# Patient Record
Sex: FEMALE | Race: WHITE | NOT HISPANIC OR LATINO | Employment: UNEMPLOYED | URBAN - METROPOLITAN AREA
[De-identification: names, ages, dates, MRNs, and addresses within clinical notes are randomized per-mention and may not be internally consistent; named-entity substitution may affect disease eponyms.]

---

## 2018-05-08 ENCOUNTER — HOSPITAL ENCOUNTER (EMERGENCY)
Facility: HOSPITAL | Age: 58
Discharge: HOME | End: 2018-05-08
Payer: MEDICAID

## 2018-05-08 VITALS
HEART RATE: 99 BPM | TEMPERATURE: 99 F | WEIGHT: 130 LBS | OXYGEN SATURATION: 97 % | RESPIRATION RATE: 18 BRPM | SYSTOLIC BLOOD PRESSURE: 137 MMHG | HEIGHT: 60 IN | DIASTOLIC BLOOD PRESSURE: 71 MMHG | BODY MASS INDEX: 25.52 KG/M2

## 2018-05-08 DIAGNOSIS — L29.9 ITCHING: ICD-10-CM

## 2018-05-08 DIAGNOSIS — L23.9 ALLERGIC CONTACT DERMATITIS, UNSPECIFIED TRIGGER: Primary | ICD-10-CM

## 2018-05-08 DIAGNOSIS — L92.3 FOREIGN BODY REACTION TO TATTOO DYES: ICD-10-CM

## 2018-05-08 PROCEDURE — 99283 EMERGENCY DEPT VISIT LOW MDM: CPT | Mod: 25

## 2018-05-08 PROCEDURE — 25000003 PHARM REV CODE 250: Performed by: REGISTERED NURSE

## 2018-05-08 PROCEDURE — 96372 THER/PROPH/DIAG INJ SC/IM: CPT

## 2018-05-08 PROCEDURE — 63600175 PHARM REV CODE 636 W HCPCS: Performed by: REGISTERED NURSE

## 2018-05-08 RX ORDER — HYDROXYZINE HYDROCHLORIDE 25 MG/1
25 TABLET, FILM COATED ORAL EVERY 6 HOURS
Qty: 12 TABLET | Refills: 0 | Status: SHIPPED | OUTPATIENT
Start: 2018-05-08

## 2018-05-08 RX ORDER — TRIAMCINOLONE ACETONIDE 1 MG/G
OINTMENT TOPICAL 2 TIMES DAILY
Qty: 80 G | Refills: 0 | Status: SHIPPED | OUTPATIENT
Start: 2018-05-08 | End: 2018-05-18

## 2018-05-08 RX ORDER — MUPIROCIN 20 MG/G
OINTMENT TOPICAL 3 TIMES DAILY
Qty: 30 G | Refills: 0 | Status: SHIPPED | OUTPATIENT
Start: 2018-05-08

## 2018-05-08 RX ORDER — PREDNISONE 20 MG/1
20 TABLET ORAL 2 TIMES DAILY
Qty: 10 TABLET | Refills: 0 | Status: SHIPPED | OUTPATIENT
Start: 2018-05-08 | End: 2018-05-13

## 2018-05-08 RX ORDER — METHYLPREDNISOLONE SOD SUCC 125 MG
125 VIAL (EA) INJECTION
Status: COMPLETED | OUTPATIENT
Start: 2018-05-08 | End: 2018-05-08

## 2018-05-08 RX ORDER — DIPHENHYDRAMINE HYDROCHLORIDE 50 MG/ML
25 INJECTION INTRAMUSCULAR; INTRAVENOUS
Status: COMPLETED | OUTPATIENT
Start: 2018-05-08 | End: 2018-05-08

## 2018-05-08 RX ORDER — FAMOTIDINE 20 MG/1
20 TABLET, FILM COATED ORAL
Status: COMPLETED | OUTPATIENT
Start: 2018-05-08 | End: 2018-05-08

## 2018-05-08 RX ADMIN — FAMOTIDINE 20 MG: 20 TABLET ORAL at 05:05

## 2018-05-08 RX ADMIN — METHYLPREDNISOLONE SODIUM SUCCINATE 125 MG: 125 INJECTION, POWDER, FOR SOLUTION INTRAMUSCULAR; INTRAVENOUS at 05:05

## 2018-05-08 RX ADMIN — DIPHENHYDRAMINE HYDROCHLORIDE 25 MG: 50 INJECTION, SOLUTION INTRAMUSCULAR; INTRAVENOUS at 05:05

## 2018-05-08 NOTE — ED PROVIDER NOTES
SCRIBE #1 NOTE: I, Marvel Starr, am scribing for, and in the presence of, Chase Sellers NP. I have scribed the entire note.      History      Chief Complaint   Patient presents with    Rash     diffuse rash with itching all over arms, legs, and back x 2 weeks; pt reports taking steroids for the past 3 days with no improvement        Review of patient's allergies indicates:   No Known Allergies     HPI   HPI    5/8/2018, 5:20 PM   History obtained from the patient      History of Present Illness: Karen Smalls is a 57 y.o. female patient who presents to the Emergency Department for a generalized, pruritic rash which onset gradually 2 weeks ago. Symptoms are constant and moderate in severity.  No mitigating or exacerbating factors reported. No other associated sxs reported. Pt reports that she was previously evaluated for her sxs at [facility] and Rx prednisone 20 mg BID. Pt finished the steriods 1 day ago w/ no relief of sxs reported. Pt is unclear on the cause of her sxs. Pt reports that she took one benadryl 3-4 hours ago. Patient denies any fever, chills, n/v/d, wheezing, sore throat, dysphagia, and all other sxs at this time. No further complaints or concerns at this time.     Arrival mode: Personal vehicle    PCP: Provider [facility]       Past Medical History:  History reviewed. No pertinent past medical history.    Past Surgical History:  Past Surgical History:   Procedure Laterality Date    HYSTERECTOMY           Family History:  History reviewed. No pertinent family history.    Social History:  Social History     Social History Main Topics    Smoking status: Former Smoker    Smokeless tobacco: Never Used    Alcohol use Yes    Drug use: No    Sexual activity: Not on file       ROS   Review of Systems   Constitutional: Negative for chills and fever.   Respiratory: Negative for shortness of breath, wheezing and stridor.    Cardiovascular: Negative for chest pain.   Gastrointestinal: Negative for  abdominal pain, diarrhea, nausea and vomiting.   Genitourinary: Negative for difficulty urinating and urgency.   Skin: Positive for rash.   Neurological: Negative for dizziness, weakness, light-headedness, numbness and headaches.   All other systems reviewed and are negative.    Physical Exam      Initial Vitals [05/08/18 1632]   BP Pulse Resp Temp SpO2   137/71 99 18 98.7 °F (37.1 °C) 97 %      MAP       93          Physical Exam  Nursing Notes and Vital Signs Reviewed.  Constitutional: Patient is in no apparent distress. Well-developed and well-nourished.  Head: Atraumatic. Normocephalic.  Eyes: PERRL. EOM intact. Conjunctivae are not pale. No scleral icterus.  ENT: Mucous membranes are moist. Oropharynx is clear and symmetric.    Neck: Supple. Full ROM. No lymphadenopathy.  Cardiovascular: Regular rate. Regular rhythm. No murmurs, rubs, or gallops.   Pulmonary/Chest: No respiratory distress. Clear to auscultation bilaterally. No wheezing or rales.  Abdominal: Soft and non-distended.  There is no tenderness.  No rebound, guarding, or rigidity. Good bowel sounds.  Musculoskeletal: Moves all extremities. No obvious deformities. No edema.   Skin: Warm and dry. Patches of erythema and scattered vesicular papular rash noted to the upper extremities and trunk. Pt has a carlton tattoo on her right forearm w/ erythema noted to the base of the tattoo, which is warm to touch, no swelling or drainage noted    Neurological:  Alert, awake, and appropriate.  Normal speech.  No acute focal neurological deficits are appreciated.  Psychiatric: Normal affect. Good eye contact. Appropriate in content.    ED Course    Procedures  ED Vital Signs:  Vitals:    05/08/18 1632   BP: 137/71   Pulse: 99   Resp: 18   Temp: 98.7 °F (37.1 °C)   TempSrc: Oral   SpO2: 97%   Weight: 59 kg (130 lb)   Height: 5' (1.524 m)            The Emergency Provider reviewed the vital signs and test results, which are outlined above.    ED Discussion     5:53 PM:  Reassessed pt at this time. Discussed with pt all pertinent ED information and results. Discussed pt dx and plan of tx. Gave pt all f/u and return to the ED instructions. All questions and concerns were addressed at this time. Pt expresses understanding of information and instructions, and is comfortable with plan to discharge. Pt is stable for discharge.    I discussed with patient and/or family/caretaker that evaluation in the ED does not suggest any emergent or life threatening medical conditions requiring immediate intervention beyond what was provided in the ED, and I believe patient is safe for discharge.  Regardless, an unremarkable evaluation in the ED does not preclude the development or presence of a serious of life threatening condition. As such, patient was instructed to return immediately for any worsening or change in current symptoms.      ED Medication(s):  Medications   methylPREDNISolone sodium succinate injection 125 mg (125 mg Intramuscular Given 5/8/18 1742)   diphenhydrAMINE injection 25 mg (25 mg Intramuscular Given 5/8/18 1742)   famotidine tablet 20 mg (20 mg Oral Given 5/8/18 1742)       Discharge Medication List as of 5/8/2018  5:52 PM      START taking these medications    Details   hydrOXYzine HCl (ATARAX) 25 MG tablet Take 1 tablet (25 mg total) by mouth every 6 (six) hours., Starting Tue 5/8/2018, Print      mupirocin (BACTROBAN) 2 % ointment Apply topically 3 (three) times daily., Starting Tue 5/8/2018, Print      predniSONE (DELTASONE) 20 MG tablet Take 1 tablet (20 mg total) by mouth 2 (two) times daily., Starting Tue 5/8/2018, Until Sun 5/13/2018, Print      triamcinolone acetonide 0.1% (KENALOG) 0.1 % ointment Apply topically 2 (two) times daily., Starting Tue 5/8/2018, Until Fri 5/18/2018, Print             Follow-up Information     Provider [facility] In 3 days.                   Medical Decision Making              Scribe Attestation:   Scribe #1: I performed the above scribed  service and the documentation accurately describes the services I performed. I attest to the accuracy of the note.    Attending:   Physician Attestation Statement for Scribe #1: I, Chase Sellers NP, personally performed the services described in this documentation, as scribed by Marvel Starr, in my presence, and it is both accurate and complete.          Clinical Impression       ICD-10-CM ICD-9-CM   1. Allergic contact dermatitis, unspecified trigger L23.9 692.9   2. Itching L29.9 698.9   3. Foreign body reaction to tattoo dyes L92.3 709.4       Disposition:   Disposition: Discharged  Condition: Stable         Chase Sellers Jr., [facility]  05/09/18 0100

## 2024-05-02 ENCOUNTER — HOSPITAL ENCOUNTER (EMERGENCY)
Facility: HOSPITAL | Age: 64
Discharge: HOME | End: 2024-05-02
Attending: EMERGENCY MEDICINE
Payer: MEDICAID

## 2024-05-02 VITALS
OXYGEN SATURATION: 100 % | WEIGHT: 100 LBS | TEMPERATURE: 98 F | BODY MASS INDEX: 20.16 KG/M2 | HEIGHT: 59 IN | DIASTOLIC BLOOD PRESSURE: 68 MMHG | RESPIRATION RATE: 16 BRPM | SYSTOLIC BLOOD PRESSURE: 126 MMHG | HEART RATE: 62 BPM

## 2024-05-02 DIAGNOSIS — R55 NEAR SYNCOPE: Primary | ICD-10-CM

## 2024-05-02 DIAGNOSIS — R06.02 SHORTNESS OF BREATH: ICD-10-CM

## 2024-05-02 DIAGNOSIS — R27.0 ATAXIA: ICD-10-CM

## 2024-05-02 DIAGNOSIS — R53.1 GENERALIZED WEAKNESS: ICD-10-CM

## 2024-05-02 LAB
ALBUMIN SERPL BCP-MCNC: 3.4 G/DL (ref 3.5–5.2)
ALP SERPL-CCNC: 65 U/L (ref 55–135)
ALT SERPL W/O P-5'-P-CCNC: 17 U/L (ref 10–44)
ANION GAP SERPL CALC-SCNC: 6 MMOL/L (ref 8–16)
AST SERPL-CCNC: 22 U/L (ref 10–40)
BASOPHILS # BLD AUTO: 0.06 K/UL (ref 0–0.2)
BASOPHILS NFR BLD: 0.8 % (ref 0–1.9)
BILIRUB SERPL-MCNC: 0.4 MG/DL (ref 0.1–1)
BILIRUB UR QL STRIP: NEGATIVE
BUN SERPL-MCNC: 15 MG/DL (ref 8–23)
CALCIUM SERPL-MCNC: 9.2 MG/DL (ref 8.7–10.5)
CHLORIDE SERPL-SCNC: 99 MMOL/L (ref 95–110)
CLARITY UR REFRACT.AUTO: ABNORMAL
CO2 SERPL-SCNC: 26 MMOL/L (ref 23–29)
COLOR UR AUTO: YELLOW
CREAT SERPL-MCNC: 0.8 MG/DL (ref 0.5–1.4)
DIFFERENTIAL METHOD BLD: ABNORMAL
EOSINOPHIL # BLD AUTO: 0.1 K/UL (ref 0–0.5)
EOSINOPHIL NFR BLD: 1.6 % (ref 0–8)
ERYTHROCYTE [DISTWIDTH] IN BLOOD BY AUTOMATED COUNT: 14 % (ref 11.5–14.5)
EST. GFR  (NO RACE VARIABLE): >60 ML/MIN/1.73 M^2
GLUCOSE SERPL-MCNC: 104 MG/DL (ref 70–110)
GLUCOSE UR QL STRIP: NEGATIVE
HCT VFR BLD AUTO: 36.1 % (ref 37–48.5)
HGB BLD-MCNC: 12.5 G/DL (ref 12–16)
HGB UR QL STRIP: NEGATIVE
IMM GRANULOCYTES # BLD AUTO: 0.02 K/UL (ref 0–0.04)
IMM GRANULOCYTES NFR BLD AUTO: 0.3 % (ref 0–0.5)
KETONES UR QL STRIP: NEGATIVE
LEUKOCYTE ESTERASE UR QL STRIP: NEGATIVE
LYMPHOCYTES # BLD AUTO: 2.3 K/UL (ref 1–4.8)
LYMPHOCYTES NFR BLD: 31.5 % (ref 18–48)
MAGNESIUM SERPL-MCNC: 1.8 MG/DL (ref 1.6–2.6)
MCH RBC QN AUTO: 30.5 PG (ref 27–31)
MCHC RBC AUTO-ENTMCNC: 34.6 G/DL (ref 32–36)
MCV RBC AUTO: 88 FL (ref 82–98)
MONOCYTES # BLD AUTO: 0.5 K/UL (ref 0.3–1)
MONOCYTES NFR BLD: 7.3 % (ref 4–15)
NEUTROPHILS # BLD AUTO: 4.3 K/UL (ref 1.8–7.7)
NEUTROPHILS NFR BLD: 58.5 % (ref 38–73)
NITRITE UR QL STRIP: NEGATIVE
NRBC BLD-RTO: 0 /100 WBC
OHS QRS DURATION: 70 MS
OHS QTC CALCULATION: 388 MS
PH UR STRIP: 8 [PH] (ref 5–8)
PLATELET # BLD AUTO: 402 K/UL (ref 150–450)
PMV BLD AUTO: 8.9 FL (ref 9.2–12.9)
POTASSIUM SERPL-SCNC: 4.2 MMOL/L (ref 3.5–5.1)
PROT SERPL-MCNC: 6.1 G/DL (ref 6–8.4)
PROT UR QL STRIP: NEGATIVE
RBC # BLD AUTO: 4.1 M/UL (ref 4–5.4)
SODIUM SERPL-SCNC: 131 MMOL/L (ref 136–145)
SP GR UR STRIP: 1.02 (ref 1–1.03)
TSH SERPL DL<=0.005 MIU/L-ACNC: 0.83 UIU/ML (ref 0.4–4)
URN SPEC COLLECT METH UR: ABNORMAL
WBC # BLD AUTO: 7.37 K/UL (ref 3.9–12.7)

## 2024-05-02 PROCEDURE — 83735 ASSAY OF MAGNESIUM: CPT | Performed by: EMERGENCY MEDICINE

## 2024-05-02 PROCEDURE — 93005 ELECTROCARDIOGRAM TRACING: CPT

## 2024-05-02 PROCEDURE — 93010 ELECTROCARDIOGRAM REPORT: CPT | Mod: ,,, | Performed by: INTERNAL MEDICINE

## 2024-05-02 PROCEDURE — 25000003 PHARM REV CODE 250: Performed by: EMERGENCY MEDICINE

## 2024-05-02 PROCEDURE — 85025 COMPLETE CBC W/AUTO DIFF WBC: CPT | Performed by: EMERGENCY MEDICINE

## 2024-05-02 PROCEDURE — 80053 COMPREHEN METABOLIC PANEL: CPT | Performed by: EMERGENCY MEDICINE

## 2024-05-02 PROCEDURE — 81003 URINALYSIS AUTO W/O SCOPE: CPT | Performed by: EMERGENCY MEDICINE

## 2024-05-02 PROCEDURE — 99285 EMERGENCY DEPT VISIT HI MDM: CPT | Mod: 25

## 2024-05-02 PROCEDURE — 84443 ASSAY THYROID STIM HORMONE: CPT | Performed by: EMERGENCY MEDICINE

## 2024-05-02 PROCEDURE — 96360 HYDRATION IV INFUSION INIT: CPT

## 2024-05-02 RX ADMIN — SODIUM CHLORIDE 1000 ML: 9 INJECTION, SOLUTION INTRAVENOUS at 02:05

## 2024-05-02 NOTE — ED PROVIDER NOTES
"Encounter Date: 5/2/2024       History     Chief Complaint   Patient presents with    Multiple complaints      SOB, dizziness, "off balanced", fall yesterday      Pt is a 64 yo F with PMH of hysterectomy who presents with generalized weakness and feeling like she was off balance with walking. She notes a fall yesterday. She reports she has had several episodes of fainting over the past one year including once where she hit her head and required staples. She has moved so not followed up with neurology or cardiology.  She notes she usually requires iv fluids to feel better when she presents to the ED.    The history is provided by the patient.     Review of patient's allergies indicates:  No Known Allergies  No past medical history on file.  Past Surgical History:   Procedure Laterality Date    HYSTERECTOMY       No family history on file.  Social History     Tobacco Use    Smoking status: Former    Smokeless tobacco: Never   Substance Use Topics    Alcohol use: Yes    Drug use: No         Physical Exam     Initial Vitals [05/02/24 1105]   BP Pulse Resp Temp SpO2   (!) 117/58 60 16 97.5 °F (36.4 °C) 100 %      MAP       --         Physical Exam    Nursing note and vitals reviewed.  Constitutional: She appears well-developed and well-nourished. She is not diaphoretic. No distress.   HENT:   Head: Normocephalic and atraumatic.   Right Ear: External ear normal.   Left Ear: External ear normal.   Nose: Nose normal.   Mouth/Throat: Oropharynx is clear and moist.   Eyes: Conjunctivae and EOM are normal.   Neck: Neck supple.   Normal range of motion.  Cardiovascular:  Normal rate, regular rhythm and intact distal pulses.           Pulmonary/Chest: No respiratory distress.   Abdominal: She exhibits no distension.   Musculoskeletal:         General: No tenderness or edema. Normal range of motion.      Cervical back: Normal range of motion and neck supple.     Neurological: She is alert and oriented to person, place, and time. " GCS score is 15. GCS eye subscore is 4. GCS verbal subscore is 5. GCS motor subscore is 6.   Able to ambulate but wider based  and unsteady   Skin: Skin is warm and dry.   Psychiatric: She has a normal mood and affect. Her behavior is normal. Judgment and thought content normal.         ED Course   Procedures  Labs Reviewed   COMPREHENSIVE METABOLIC PANEL - Abnormal; Notable for the following components:       Result Value    Sodium 131 (*)     Albumin 3.4 (*)     Anion Gap 6 (*)     All other components within normal limits   CBC W/ AUTO DIFFERENTIAL - Abnormal; Notable for the following components:    Hematocrit 36.1 (*)     MPV 8.9 (*)     All other components within normal limits   URINALYSIS, REFLEX TO URINE CULTURE - Abnormal; Notable for the following components:    Appearance, UA Hazy (*)     All other components within normal limits    Narrative:     Specimen Source->Urine   MAGNESIUM   TSH     EKG Readings: (Independently Interpreted)   Initial Reading: No STEMI. Previous EKG: Compared with most recent EKG Rhythm: Sinus Bradycardia. Heart Rate: 55. Ectopy: No Ectopy. Conduction: Normal.   No prior for comparison     ECG Results              EKG 12-lead (Final result)        Collection Time Result Time QRS Duration OHS QTC Calculation    05/02/24 11:09:35 05/02/24 12:42:29 70 388                     Final result by Interface, Lab In [facility] (05/02/24 12:42:41)                   Narrative:    Test Reason : R06.02,    Vent. Rate : 055 BPM     Atrial Rate : 055 BPM     P-R Int : 120 ms          QRS Dur : 070 ms      QT Int : 406 ms       P-R-T Axes : 055 077 049 degrees     QTc Int : 388 ms    Sinus bradycardia  Otherwise normal ECG  No previous ECGs available  Confirmed by Beverly Vu MD ([id number]) on 5/2/2024 12:42:25 PM    Referred By:             Confirmed By:Beverly Vu MD                                  Imaging Results              MRI Brain Without Contrast (Final result)  Result time 05/02/24  13:59:52      Final result by Justin Espitia MD (05/02/24 13:59:52)                   Impression:      No acute intracranial process.  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      Electronically signed by: Justin Espitia  Date:    05/02/2024  Time:    13:59               Narrative:    EXAMINATION:  MRI BRAIN WITHOUT CONTRAST    CLINICAL HISTORY:  Dizziness, non-specific;Dizziness, persistent/recurrent, cardiac or vascular cause suspected;    TECHNIQUE:  Multiplanar multisequence MR imaging of the brain was performed without contrast.    COMPARISON:  None.    FINDINGS:  There is no evidence of hydrocephalus mass effect intracranial hemorrhage or acute infarct.    Encephalomalacia and gliosis within the left greater than right frontal and left temporal lobes may be posttraumatic from prior contusions if clinically consistent.  The cerebellum and parenchyma otherwise maintains normal signal intensity.    Normal arterial flow voids are preserved at the skull base.    Visualized sinuses and mastoid air cells are clear.                                       Medications   sodium chloride 0.9% bolus 1,000 mL 1,000 mL (0 mLs Intravenous Stopped 5/2/24 1504)     Medical Decision Making  DDX:: vasovagal syncope, orthostatic hypotension, hypovolemia, POTS    MRI neg for posterior stroke  She received iv fluids, is feeling better  EKG without ectopy  Referred to a pcp since patient has not established care since moving here  Discharged to home in stable condition, return to ED warnings given, follow up and patient care instructions given.              Amount and/or Complexity of Data Reviewed  Labs: ordered.  Radiology: ordered.                                      Clinical Impression:  Final diagnoses:  [R06.02] Shortness of breath  [R27.0] Ataxia  [R55] Near syncope (Primary)  [R53.1] Generalized weakness          ED Disposition Condition     Discharge Stable          ED Prescriptions    None       Follow-up Information       Follow up With Specialties Details Why Contact Info Additional Information    [facility] Emergency Medicine   [address]  [facility] [address] [phone]     [facility]  Call   Call the referral line at [phone] to follow up with a primary doctor.     [facility]   [address]  [facility] [address] [phone] [facility] Please park in surface lot and check in at central registration desk             Elvie Isaac MD  05/03/24 2937

## 2024-05-02 NOTE — ED NOTES
Ambulating well, normal gait, reports that dizziness and unbalanced feeling has mostly subsided at this time.

## 2024-05-02 NOTE — ED NOTES
"Karen Smalls, a 63 y.o. female presents to the ED w/ complaint of sob dizziness    Triage note:  Chief Complaint   Patient presents with    Multiple complaints      SOB, dizziness, "off balanced", fall yesterday      Review of patient's allergies indicates:  No Known Allergies  No past medical history on file.    LOC: Patient name and date of birth verified. The patient is awake, alert and aware of environment with an appropriate affect, the patient is oriented x 3 and speaking appropriately.   APPEARANCE: Patient resting comfortably, patient is clean and well groomed, patient's clothing is properly fastened.  SKIN: The skin is warm and dry, color consistent with ethnicity, patient has normal skin turgor and moist mucus membranes, skin intact, no breakdown or bruising noted.  MUSCULOSKELETAL: Patient moving all extremities well, no obvious swelling or deformities noted.   RESPIRATORY: Respirations are spontaneous, patient has a normal effort and rate, no accessory muscle use noted.  CARDIAC: Patient has a normal rate and rhythm, no periphreal edema noted, capillary refill < 3 seconds.  ABDOMEN: Soft and non tender to palpation, no distention noted. Bowel sounds present in all four quadrants.  NEUROLOGIC: Eyes open spontaneously, behavior appropriate to situation, follows commands, facial expression symmetrical, bilateral hand grasp equal and even, purposeful motor response noted, normal sensation in all extremities when touched with a finger.    "

## 2024-05-02 NOTE — DISCHARGE INSTRUCTIONS
MRI brain shows:  There is no evidence of hydrocephalus mass effect intracranial hemorrhage or acute infarct.  Encephalomalacia and gliosis within the left greater than right frontal and left temporal lobes may be posttraumatic from prior contusions if clinically consistent.  The cerebellum and parenchyma otherwise maintains normal signal intensity.   Normal arterial flow voids are preserved at the skull base.  Visualized sinuses and mastoid air cells are clear.  Impression:  No acute intracranial process.  No evidence of acute infarct.  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       Your sodium was mildly low at 131 and you were given normal saline intravenously.     I recommend that you establish care with a primary care doctor for close follow up.